# Patient Record
Sex: FEMALE | Race: WHITE | NOT HISPANIC OR LATINO | Employment: OTHER | URBAN - METROPOLITAN AREA
[De-identification: names, ages, dates, MRNs, and addresses within clinical notes are randomized per-mention and may not be internally consistent; named-entity substitution may affect disease eponyms.]

---

## 2017-01-25 DIAGNOSIS — R90.82 WHITE MATTER ABNORMALITY ON MRI OF BRAIN: ICD-10-CM

## 2017-01-25 DIAGNOSIS — R41.3 MEMORY CHANGE: ICD-10-CM

## 2017-01-25 DIAGNOSIS — I67.82 BRAIN ISCHEMIA: ICD-10-CM

## 2017-01-25 RX ORDER — CILOSTAZOL 50 MG/1
50 TABLET ORAL 2 TIMES DAILY
Qty: 60 TABLET | Refills: 3 | OUTPATIENT
Start: 2017-01-25 | End: 2018-01-25

## 2017-01-27 ENCOUNTER — OFFICE VISIT (OUTPATIENT)
Dept: PAIN MEDICINE | Facility: CLINIC | Age: 82
End: 2017-01-27
Payer: MEDICARE

## 2017-01-27 VITALS
DIASTOLIC BLOOD PRESSURE: 70 MMHG | HEART RATE: 76 BPM | SYSTOLIC BLOOD PRESSURE: 120 MMHG | TEMPERATURE: 98 F | BODY MASS INDEX: 28.72 KG/M2 | WEIGHT: 156.06 LBS | HEIGHT: 62 IN | RESPIRATION RATE: 18 BRPM

## 2017-01-27 DIAGNOSIS — M47.816 LUMBAR FACET ARTHROPATHY: ICD-10-CM

## 2017-01-27 DIAGNOSIS — M51.36 DDD (DEGENERATIVE DISC DISEASE), LUMBAR: Primary | ICD-10-CM

## 2017-01-27 DIAGNOSIS — M48.061 LUMBAR STENOSIS: ICD-10-CM

## 2017-01-27 PROCEDURE — 99204 OFFICE O/P NEW MOD 45 MIN: CPT | Mod: S$PBB,,, | Performed by: ANESTHESIOLOGY

## 2017-01-27 PROCEDURE — 99999 PR PBB SHADOW E&M-EST. PATIENT-LVL III: CPT | Mod: PBBFAC,,, | Performed by: ANESTHESIOLOGY

## 2017-01-27 PROCEDURE — 99213 OFFICE O/P EST LOW 20 MIN: CPT | Mod: PBBFAC,PO | Performed by: ANESTHESIOLOGY

## 2017-01-27 NOTE — PROGRESS NOTES
This note was completed with dictation software and grammatical errors may exist.    CC:Back pain    HPI: The patient is an 84-year-old woman with a history of subdural hematoma, lumbar stenosis who presents in referral from Lucina Erickson for back pain.  The patient has had years of low back pain, is located in the midline low back slightly worse to the left side.  It is worse with standing in one place or walking, she can only walk for about 1 block before she needs to sit down.  She did fall several months ago and this seemed to worsen her pain slightly.  She had seen Lucina Erickson who ordered an MRI of her lumbar spine which is reviewed as noted below.  She had done physical therapy at [facility] and doing very well with aquatic therapy but when she did land-based therapy it seemed to worsen her pain.  Thus she has been going to the pool where she lives at [facility] 3 times a week and exercising in the pool on her own and she feels that this has improved her back pain.  She denies any weakness or numbness in the legs, no bowel or bladder incontinence.  She walks with a cane, has tried a walker in the past and that may help for long distances.  She reports complete relief with sitting, laying down or using heat.      ROS: She reports back pain and loss of balance.  Balance of review of systems is negative.    Past Medical History   Diagnosis Date    Hypothyroid     Subdural hematoma      surgery       Past Surgical History   Procedure Laterality Date    Subdural hemorrhage drainage         Social History     Social History    Marital status:      Spouse name: N/A    Number of children: N/A    Years of education: N/A     Social History Main Topics    Smoking status: Former Smoker     Quit date: 10/14/1965    Smokeless tobacco: Never Used    Alcohol use No    Drug use: None    Sexual activity: Not Asked     Other Topics Concern    None     Social History Narrative         Medications/Allergies:  "See med card    Vitals:    17 1047   BP: 120/70   Pulse: 76   Resp: 18   Temp: 97.6 °F (36.4 °C)   TempSrc: Oral   Weight: 70.8 kg (156 lb 1.4 oz)   Height: 5' 2" (1.575 m)   PainSc:   4   PainLoc: Back         Physical exam:  Gen: A and O x3, pleasant, well-groomed  Skin: No rashes or obvious lesions  HEENT: PERRLA, no obvious deformities on ears or in canals. Trachea midline.  CVS: Regular rate and rhythm, normal palpable pulses.  Resp:No increased work of breathing, symmetrical chest rise.  Abdomen: Soft, NT/ND.  Musculoskeletal:And stands with mild kyphosis, forward leaning gait, uses cane.      Neuro:  Lower extremities: 5/5 strength bilaterally  Reflexes: Patellar 0+, Achilles 0+ bilaterally.  Sensory:  Intact and symmetrical to light touch and pinprick in L2-S1 dermatomes bilaterally.    Lumbar spine:  Lumbar spine:Range of motion is mildly reduced with forward flexion, moderately reduced with extension with mild increased pain in the low back in the midline.    Mitchell's test causes no increased pain on either side.    Supine straight leg raise is negative bilaterally.    Internal and external rotation of the hip causes no increased pain on either side.  Myofascial exam: No tenderness to palpation across lumbar paraspinous muscles.    Imagin16 MRI L-spine.  I reviewed the images personally, and his most significant for severe spinal stenosis at L4/5 secondary to facet and ligamentous hypertrophy in addition to disc extrusion.  1. Lumbar scoliosis.  2. L1-2 mild disc bulge with a superimposed left posterior paracentral disc extrusion causing left paracentral thecal sac compression and moderate left foraminal stenosis.  3. L2-3 mild disc bulge with moderate bilateral foraminal stenosis.  4. L3-4 minimal disc bulge with moderate right foraminal stenosis. There is moderate degenerative facet arthrosis.  5. L4-5 severe spinal stenosis from a combination of disc extrusion and degenerative facet " arthrosis. There is mild left and moderate right foraminal stenosis.  6. L5-S1 minimal disc bulge.      Assessment:  The patient is an 84-year-old woman with a history of subdural hematoma, lumbar stenosis who presents in referral from Lucina Erickson for back pain.  1. DDD (degenerative disc disease), lumbar     2. Lumbar stenosis     3. Lumbar facet arthropathy           Plan:  1.  We discussed that she has low back pain secondary to stenosis and facet arthropathy but that seems fairly manageable at this point.  She is not having any radicular symptoms into her legs.  I explained that I think the best thing she could be doing is continuing to exercise in the pool where she lives.  If her pain should worsen especially if she has any radicular symptoms I will have her contact me and we discussed the role of interventional procedures.

## 2017-01-27 NOTE — LETTER
January 27, 2017      Ramon Villafuerte MD  [address] [facility] [address]  [address]  [facility] [address]           [facility]  [address] [facility] [address] [place] [address]  Phone: [phone]          Patient: Yulisa Vivar   MR Number: [id number]   YOB: 1932   Date of Visit: 1/27/2017       Dear Dr. Ramon Villafuerte:    Thank you for referring Yulisa Vivar to me for evaluation. Attached you will find relevant portions of my assessment and plan of care.    If you have questions, please do not hesitate to call me. I look forward to following Yulisa Vivar along with you.    Sincerely,    Manuel Madrid MD    Enclosure  CC:  No Recipients    If you would like to receive this communication electronically, please contact [email or web address] or [phone] to request more information on [employer] access.    For providers and/or their staff who would like to refer a patient to [facility], please contact us through our one-stop-shop provider referral line, [facility], at [phone].    If you feel you have received this communication in error or would no longer like to receive these types of communications, please e-mail [email or web address]

## 2017-03-22 DIAGNOSIS — R90.82 WHITE MATTER ABNORMALITY ON MRI OF BRAIN: ICD-10-CM

## 2017-03-22 DIAGNOSIS — I67.82 BRAIN ISCHEMIA: ICD-10-CM

## 2017-03-22 DIAGNOSIS — R41.3 MEMORY CHANGE: ICD-10-CM

## 2017-03-22 RX ORDER — CILOSTAZOL 50 MG/1
50 TABLET ORAL 2 TIMES DAILY
Qty: 60 TABLET | Refills: 3 | OUTPATIENT
Start: 2017-03-22 | End: 2018-03-22

## 2017-03-30 DIAGNOSIS — R41.3 MEMORY CHANGE: ICD-10-CM

## 2017-03-30 DIAGNOSIS — I67.82 BRAIN ISCHEMIA: ICD-10-CM

## 2017-03-30 DIAGNOSIS — R90.82 WHITE MATTER ABNORMALITY ON MRI OF BRAIN: ICD-10-CM

## 2017-03-30 RX ORDER — CILOSTAZOL 50 MG/1
50 TABLET ORAL 2 TIMES DAILY
Qty: 60 TABLET | Refills: 3 | OUTPATIENT
Start: 2017-03-30 | End: 2018-03-30

## 2017-04-11 RX ORDER — LEVOTHYROXINE SODIUM 88 UG/1
88 TABLET ORAL DAILY
Qty: 90 TABLET | Refills: 0 | Status: SHIPPED | OUTPATIENT
Start: 2017-04-11 | End: 2017-07-03 | Stop reason: SDUPTHER

## 2017-05-31 DIAGNOSIS — R90.82 WHITE MATTER ABNORMALITY ON MRI OF BRAIN: ICD-10-CM

## 2017-05-31 DIAGNOSIS — I67.82 BRAIN ISCHEMIA: ICD-10-CM

## 2017-05-31 RX ORDER — CLOPIDOGREL BISULFATE 75 MG/1
75 TABLET ORAL DAILY
Qty: 30 TABLET | Refills: 3 | Status: ON HOLD | OUTPATIENT
Start: 2017-05-31 | End: 2019-11-01 | Stop reason: HOSPADM

## 2017-05-31 NOTE — TELEPHONE ENCOUNTER
----- Message from Makenzie Cuellar sent at 5/31/2017  4:03 PM CDT -----  Contact: April/[facility]  April called to request a refill on:    1. Plavix    [facility] - [place], [place] - [address] [place] [id number]  Phone: [phone] Fax: [phone]    Thanks,  Makenzie

## 2017-06-09 DIAGNOSIS — R41.3 MEMORY CHANGE: ICD-10-CM

## 2017-06-09 DIAGNOSIS — R90.82 WHITE MATTER ABNORMALITY ON MRI OF BRAIN: ICD-10-CM

## 2017-06-09 DIAGNOSIS — I67.82 BRAIN ISCHEMIA: ICD-10-CM

## 2017-06-09 RX ORDER — CILOSTAZOL 50 MG/1
50 TABLET ORAL 2 TIMES DAILY
Qty: 60 TABLET | Refills: 3 | OUTPATIENT
Start: 2017-06-09 | End: 2018-06-09

## 2017-07-03 DIAGNOSIS — R41.3 MEMORY CHANGE: ICD-10-CM

## 2017-07-03 DIAGNOSIS — I67.82 BRAIN ISCHEMIA: ICD-10-CM

## 2017-07-03 DIAGNOSIS — R90.82 WHITE MATTER ABNORMALITY ON MRI OF BRAIN: ICD-10-CM

## 2017-07-03 RX ORDER — LEVOTHYROXINE SODIUM 88 UG/1
88 TABLET ORAL DAILY
Qty: 90 TABLET | Refills: 0 | Status: SHIPPED | OUTPATIENT
Start: 2017-07-03 | End: 2017-09-18 | Stop reason: SDUPTHER

## 2017-07-03 RX ORDER — CILOSTAZOL 50 MG/1
50 TABLET ORAL 2 TIMES DAILY
Qty: 60 TABLET | Refills: 3 | OUTPATIENT
Start: 2017-07-03 | End: 2018-07-03

## 2017-07-03 NOTE — TELEPHONE ENCOUNTER
Spoke to patient's daughter Case and she states that patient has switched PCP's. States she will call [facility] and get this corrected.

## 2017-09-19 RX ORDER — LEVOTHYROXINE SODIUM 88 UG/1
88 TABLET ORAL DAILY
Qty: 90 TABLET | Refills: 0 | Status: SHIPPED | OUTPATIENT
Start: 2017-09-19 | End: 2017-12-18 | Stop reason: SDUPTHER

## 2017-09-22 ENCOUNTER — OUTPATIENT CASE MANAGEMENT (OUTPATIENT)
Dept: ADMINISTRATIVE | Facility: OTHER | Age: 82
End: 2017-09-22

## 2017-09-22 NOTE — PROGRESS NOTES
The following patient has been assigned to Case Manuel RN with [facility] for high risk screening.    Reason: High Risk    Please contact [facility] at [phone] with any questions.    Thank you,  Marva Singleton

## 2017-10-02 ENCOUNTER — OUTPATIENT CASE MANAGEMENT (OUTPATIENT)
Dept: ADMINISTRATIVE | Facility: OTHER | Age: 82
End: 2017-10-02

## 2017-10-02 NOTE — PROGRESS NOTES
10/2/17-RN ABIMBOLA called and spoke with Case Hutchinson, daughter of patient in attempt to screen for OPCM. RN ABIMBOLA explained to patient's daughter purpose of OPCM. Declined need for assistance at this time. RN will send contact information for OOC and OPCM. Encouraged to contact OPCM in the event that needs develop. Verbalized understanding. Will close case at this time

## 2017-12-14 DIAGNOSIS — E78.5 HYPERLIPIDEMIA, UNSPECIFIED HYPERLIPIDEMIA TYPE: ICD-10-CM

## 2017-12-16 RX ORDER — ATORVASTATIN CALCIUM 20 MG/1
20 TABLET, FILM COATED ORAL DAILY
Qty: 30 TABLET | Refills: 3 | Status: SHIPPED | OUTPATIENT
Start: 2017-12-16 | End: 2019-10-31 | Stop reason: CLARIF

## 2017-12-18 ENCOUNTER — PATIENT MESSAGE (OUTPATIENT)
Dept: FAMILY MEDICINE | Facility: CLINIC | Age: 82
End: 2017-12-18

## 2017-12-19 RX ORDER — LEVOTHYROXINE SODIUM 88 UG/1
88 TABLET ORAL DAILY
Qty: 90 TABLET | Refills: 0 | Status: SHIPPED | OUTPATIENT
Start: 2017-12-19 | End: 2018-12-26

## 2017-12-20 ENCOUNTER — PATIENT MESSAGE (OUTPATIENT)
Dept: FAMILY MEDICINE | Facility: CLINIC | Age: 82
End: 2017-12-20

## 2018-04-20 DIAGNOSIS — E78.5 HYPERLIPIDEMIA, UNSPECIFIED HYPERLIPIDEMIA TYPE: ICD-10-CM

## 2018-04-23 RX ORDER — ATORVASTATIN CALCIUM 20 MG/1
20 TABLET, FILM COATED ORAL DAILY
Qty: 30 TABLET | Refills: 3 | OUTPATIENT
Start: 2018-04-23

## 2018-08-27 DIAGNOSIS — E78.5 HYPERLIPIDEMIA, UNSPECIFIED HYPERLIPIDEMIA TYPE: ICD-10-CM

## 2018-08-27 RX ORDER — ATORVASTATIN CALCIUM 20 MG/1
20 TABLET, FILM COATED ORAL DAILY
Qty: 30 TABLET | Refills: 3 | OUTPATIENT
Start: 2018-08-27

## 2018-08-28 ENCOUNTER — PATIENT MESSAGE (OUTPATIENT)
Dept: FAMILY MEDICINE | Facility: CLINIC | Age: 83
End: 2018-08-28

## 2018-12-26 PROBLEM — E78.5 HLD (HYPERLIPIDEMIA): Status: ACTIVE | Noted: 2018-12-26

## 2018-12-26 PROBLEM — A41.9 SEPSIS: Status: ACTIVE | Noted: 2018-12-26

## 2018-12-26 PROBLEM — N30.00 ACUTE CYSTITIS WITHOUT HEMATURIA: Status: ACTIVE | Noted: 2018-12-26

## 2019-10-31 VITALS
RESPIRATION RATE: 14 BRPM | HEART RATE: 85 BPM | TEMPERATURE: 98 F | DIASTOLIC BLOOD PRESSURE: 67 MMHG | SYSTOLIC BLOOD PRESSURE: 105 MMHG | OXYGEN SATURATION: 94 %

## 2019-10-31 VITALS
HEART RATE: 89 BPM | SYSTOLIC BLOOD PRESSURE: 95 MMHG | RESPIRATION RATE: 17 BRPM | DIASTOLIC BLOOD PRESSURE: 78 MMHG | TEMPERATURE: 99 F | OXYGEN SATURATION: 100 %

## 2019-10-31 PROBLEM — F03.90 DEMENTIA WITHOUT BEHAVIORAL DISTURBANCE: Status: ACTIVE | Noted: 2019-10-31

## 2019-10-31 PROBLEM — E43 SEVERE PROTEIN-CALORIE MALNUTRITION: Status: ACTIVE | Noted: 2019-10-31

## 2019-11-01 VITALS
OXYGEN SATURATION: 98 % | SYSTOLIC BLOOD PRESSURE: 158 MMHG | HEART RATE: 73 BPM | DIASTOLIC BLOOD PRESSURE: 91 MMHG | TEMPERATURE: 98 F | RESPIRATION RATE: 14 BRPM